# Patient Record
Sex: MALE | Race: BLACK OR AFRICAN AMERICAN | URBAN - METROPOLITAN AREA
[De-identification: names, ages, dates, MRNs, and addresses within clinical notes are randomized per-mention and may not be internally consistent; named-entity substitution may affect disease eponyms.]

---

## 2017-08-01 ENCOUNTER — OFFICE VISIT (OUTPATIENT)
Dept: URGENT CARE | Facility: URGENT CARE | Age: 32
End: 2017-08-01
Payer: COMMERCIAL

## 2017-08-01 VITALS
WEIGHT: 144 LBS | DIASTOLIC BLOOD PRESSURE: 80 MMHG | HEART RATE: 114 BPM | OXYGEN SATURATION: 97 % | SYSTOLIC BLOOD PRESSURE: 111 MMHG | TEMPERATURE: 98.1 F

## 2017-08-01 DIAGNOSIS — B30.9 VIRAL CONJUNCTIVITIS: Primary | ICD-10-CM

## 2017-08-01 PROCEDURE — 99213 OFFICE O/P EST LOW 20 MIN: CPT | Performed by: FAMILY MEDICINE

## 2017-08-01 RX ORDER — ERYTHROMYCIN 5 MG/G
1 OINTMENT OPHTHALMIC 3 TIMES DAILY
Qty: 1 G | Refills: 0 | Status: SHIPPED | OUTPATIENT
Start: 2017-08-01 | End: 2017-08-06

## 2017-08-01 NOTE — MR AVS SNAPSHOT
"              After Visit Summary   2017    Robbie Combs    MRN: [id number]           Patient Information     Date Of Birth          1985        Visit Information        Provider Department      2017 2:50 PM Juvenal Galeana MD [facility]        Today's Diagnoses     Viral conjunctivitis    -  1       Follow-ups after your visit        Who to contact     If you have questions or need follow up information about today's clinic visit or your schedule please contact [facility] directly at [phone].  Normal or non-critical lab and imaging results will be communicated to you by [employer], letter or phone within 4 business days after the clinic has received the results. If you do not hear from us within 7 days, please contact the clinic through [employer] or phone. If you have a critical or abnormal lab result, we will notify you by phone as soon as possible.  Submit refill requests through [employer] or call your pharmacy and they will forward the refill request to us. Please allow 3 business days for your refill to be completed.          Additional Information About Your Visit        MyChart Information     [email or web address] lets you send messages to your doctor, view your test results, renew your prescriptions, schedule appointments and more. To sign up, go to [email or web address] . Click on \"Log in\" on the left side of the screen, which will take you to the Welcome page. Then click on \"Sign up Now\" on the right side of the page.     You will be asked to enter the access code listed below, as well as some personal information. Please follow the directions to create your username and password.     Your access code is: [email or web address]  Expires: 10/30/2017  4:51 PM     Your access code will  in 90 days. If you need help or a new code, please call your [facility] or [phone].        Care EveryWhere ID     This is your Care EveryWhere ID. This could be used by other " organizations to access your [facility] medical records  [id number]        Your Vitals Were     Pulse Temperature Pulse Oximetry             114 98.1  F (36.7  C) (Oral) 97%          Blood Pressure from Last 3 Encounters:   08/01/17 111/80    Weight from Last 3 Encounters:   08/01/17 144 lb (65.3 kg)              Today, you had the following     No orders found for display         Today's Medication Changes          These changes are accurate as of: 8/1/17  7:18 PM.  If you have any questions, ask your nurse or doctor.               Start taking these medicines.        Dose/Directions    erythromycin ophthalmic ointment   Commonly known as:  ROMYCIN   Used for:  Viral conjunctivitis   Started by:  Juvenal Galeana MD        Dose:  1 Application   Place 1 Application into the right eye 3 times daily for 5 days Thin ribbon in right eye as directed.   Quantity:  1 g   Refills:  0       ketotifen 0.025 % Soln ophthalmic solution   Commonly known as:  ZADITOR   Used for:  Viral conjunctivitis   Started by:  Juvenal Galeana MD        Dose:  1 drop   Place 1 drop into both eyes every 12 hours   Quantity:  1 Bottle   Refills:  0            Where to get your medicines      These medications were sent to [facility]/pharm[facility] #[id number] - [place], [place] - [address]  [address] [address]     Phone:  [phone]     erythromycin ophthalmic ointment    ketotifen 0.025 % Soln ophthalmic solution                Primary Care Provider    None Specified       No primary provider on file.        Equal Access to Services     [facility]: Hadii guillermina Torres, waaxda luqadaha, qaybta kaalmada angela salgado. So [facility] [phone].    ATENCIÓN: Si habla español, tiene a grant disposición servicios gratuitos de asistencia lingüística. Llame al [phone].    We comply with applicable federal civil rights laws and [place] laws. We do not discriminate on the  basis of race, color, national origin, age, disability sex, sexual orientation or gender identity.            Thank you!     Thank you for choosing [facility]  for your care. Our goal is always to provide you with excellent care. Hearing back from our patients is one way we can continue to improve our services. Please take a few minutes to complete the written survey that you may receive in the mail after your visit with us. Thank you!             Your Updated Medication List - Protect others around you: Learn how to safely use, store and throw away your medicines at www.disposemymeds.org.          This list is accurate as of: 8/1/17  7:18 PM.  Always use your most recent med list.                   Brand Name Dispense Instructions for use Diagnosis    erythromycin ophthalmic ointment    ROMYCIN    1 g    Place 1 Application into the right eye 3 times daily for 5 days Thin ribbon in right eye as directed.    Viral conjunctivitis       IBUPROFEN PO      Take 200 mg by mouth        ketotifen 0.025 % Soln ophthalmic solution    ZADITOR    1 Bottle    Place 1 drop into both eyes every 12 hours    Viral conjunctivitis

## 2017-08-01 NOTE — NURSING NOTE
Chief Complaint   Patient presents with     URI     Pt c/o cough, runny nose, and right eye problem.        Initial /80 (BP Location: Left arm, Patient Position: Chair, Cuff Size: Adult Regular)  Pulse 114  Temp 98.1  F (36.7  C) (Oral)  Wt 144 lb (65.3 kg)  SpO2 97% There is no height or weight on file to calculate BMI.  Medication Reconciliation: complete       Gwen Cochran CMA (AAMA)